# Patient Record
(demographics unavailable — no encounter records)

---

## 2024-10-15 NOTE — PHYSICAL EXAM
[TextEntry] : Dorsalis pedis and posterior tibial pulses were palpable and equal bilat.  The capillary return was instant bilat.  The patient exhibited a flatfooted appearance upon weight bearing with collapsed medial arches and lateral bowing of the Achilles tendons. A biomechanical exam was performed on the patient.  The patient was seated in the chair.  Evaluation of the joints of the foot in passive range of motion was performed.  The sub talar joint was evaluated in both neutral and relaxed position.  The sub talar joint is noted to be in a pronated position.  There is also a mild equinus deformity noted.  Upon examination of the forefoot there is a slight forefoot varus present in compensation for the valgus position of the heel and a pronated sub talar joint.  Decreased ankle dorsiflexion was noted bilateral, the affected foot had less dorsiflexion. First metatarsal phalangeal joint dorsiflexion was limited. Resting calcaneal stance position was 5 degrees everted bilateral.  	Gait exam was also performed, which reveals an eversion of the heel bone and mid foot collapse with excessive pronation.  The forefoot is in a compensated varus position during gait as well.  Evaluation of shoe gear reveals excessive lateral heel wear bilateral with considerable breakdown of the heel counter and excessive flexibility of the soles.  The toenails were elongated, thickened with yellowish discoloration on toes 1, 2, 3, 4 and 5 right foot and 1, 2, 3, 4 and 5 left foot.  Incurvation was noted on multiple toes with erythema around the toenail plates.  Patient exhibited pain upon palpation of the toenails, which caused marked limitation of ambulation.  No other changes in podiatric status including vascular, orthopedic or dermatological.

## 2024-10-15 NOTE — HISTORY OF PRESENT ILLNESS
[FreeTextEntry1] : Mushtaq El is a 56-year-old male patient who returns today after a long absence with two problems.  First, he states that he needs new orthotics.  He states that he cannot walk for any length of time without his old orthotics, which are wearing out.  He states that his feet always hurt.  Secondly, he notices thickening with yellowing of the nails and finds them very difficult to cut, and he notices they are hurting more and more lately.  He denies any treatment for that but is interested in treatment of that.

## 2024-10-15 NOTE — ASSESSMENT
[FreeTextEntry1] : Assessment: Onychomycosis caused by T. Rubrum. Calcaneal valgus with plantar fasciitis.  Plan: I discussed the treatment plan with the patient regarding the fungal nails.  I discussed oral vs topical treatment.  The patient chose the topical treatment at this time and I explained that it takes approximately 15 months for the nails to grow out.  I explained that he will be returning every other month for follow up for the nails.  Debridement of each toenail on each foot was performed both mechanically and via electrical grinding.  All toenails were trimmed with a 14 cm sterile stainless steel box lock double spring nail splitter.  Then utilizing a sterile pear shaped cindy ariel (this device falls under bur, surgical, general & plastic surgery.  The FDA deems this item a Class-1 device) via a 35,000 RPM electric drill and vacuum and dust extractor system all toenails were aseptically debrided removing fungal layers.  This is done to diminish the fungal load of the toenails and enhance the effects of the antifungal medication, allowing overall improvement in the degree of fungal infection as well as improve appearance and reduce discomfort and help diminish chances of secondary bacterial infection, also lessening the chance of ingrown nails, especially when performed on a regular basis.   The patient was instructed as to the potential biomechanical and protective benefits that orthotic therapy could provide.  The patient was educated in the process of how orthotic devices would promote improved foot function and shock absorption.  A thorough explanation was given to the patient in reference to the benefits orthotic devices may provide in not only relieving the current symptoms, but in also preventing possible recurrence and/or secondary biomechanically related problems.  The patient was instructed about the potential benefits of physical therapy as a conservative means of treating this condition, either independently, or as an adjunct to other conservative treatment regimens such as, injection therapy, strapping, oral anti-inflammatory medications and the usage of orthotics.  The types of physical therapy advocated for the treatment of this diagnosis includes: whirlpool and ultrasound.  The patient was instructed that physical therapy is considered therapeutic when performed 2-3 times per week. The patient was given stretching exercises to be performed for the Achilles tendon and plantar fascial regions of both feet.  I instructed the patient to perform the exercises gradually at first and then more aggressively twice per day, for 2 minutes per exercise.  The patient was given written instructions for reference. The patient was advised to limit weight-bearing activities for the next 2 weeks.  We discussed oral versus topical treatments for the fungus.  I gave him a prescription for LFTs.  If all were within normal limits, I will start him on Lamisil therapy.  I will follow up with him as soon as his blood work is available and he will return to be scanned for orthotics.  PTR: 2 months.

## 2024-12-06 NOTE — HISTORY OF PRESENT ILLNESS
[FreeTextEntry1] : Patient returns to  his custom orthotics.  He presents with the shoes that he is going to wear them in.

## 2024-12-06 NOTE — PHYSICAL EXAM
[TextEntry] : Patient exhibited a flatfooted appearance upon weight bearing with collapsed medial arches and lateral bowing of the Achilles tendons. The orthotic devices fit well when placed up against the feet.  Length of the plates is adequate bilateral.  A 4-degree varus rearfoot posting was observed, which creates a subtalar joint neutral position during midstance weight bearing. Gait analysis shows improved foot position during early heelstrike gait, and rectus knee alignment throughout gait.  Patient denies foot, ankle, knee or hip pain during the exam.  Patient denies any pressure points or sharp edges.

## 2024-12-06 NOTE — ASSESSMENT
[FreeTextEntry1] : Assessment: Calcaneal valgus deformities bilat.   Plan: I dispensed orthotics to the patient.  I examined them up against his feet to check the fit. I placed them in his sneakers and had him walk in the devices to check for heel eversion, abnormal pronation and corrected knee position during gait. I explained the importance of breaking them in slowly, by wearing them up to an hour today if they felt comfortable and then increasing the usage of the orthotics by an hour each day until able to wear them comfortably in his regular shoes.  I explained that adjustments might be needed.  He was instructed to have the feet and the orthotics checked in 1 month, 3 months and then every 6 months.  PTR: 1 month.